# Patient Record
Sex: MALE | Race: BLACK OR AFRICAN AMERICAN | NOT HISPANIC OR LATINO
[De-identification: names, ages, dates, MRNs, and addresses within clinical notes are randomized per-mention and may not be internally consistent; named-entity substitution may affect disease eponyms.]

---
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
## 2021-07-09 ENCOUNTER — EMERGENCY (EMERGENCY)
Facility: HOSPITAL | Age: 21
LOS: 0 days | Discharge: HOME | End: 2021-07-10
Attending: STUDENT IN AN ORGANIZED HEALTH CARE EDUCATION/TRAINING PROGRAM
Payer: COMMERCIAL

## 2021-07-09 VITALS
TEMPERATURE: 98 F | OXYGEN SATURATION: 98 % | HEIGHT: 67 IN | DIASTOLIC BLOOD PRESSURE: 78 MMHG | RESPIRATION RATE: 18 BRPM | HEART RATE: 94 BPM | SYSTOLIC BLOOD PRESSURE: 142 MMHG | WEIGHT: 199.96 LBS

## 2021-07-09 DIAGNOSIS — S01.511A LACERATION WITHOUT FOREIGN BODY OF LIP, INITIAL ENCOUNTER: ICD-10-CM

## 2021-07-09 DIAGNOSIS — Z23 ENCOUNTER FOR IMMUNIZATION: ICD-10-CM
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
## 2021-07-09 DIAGNOSIS — Z98.890 OTHER SPECIFIED POSTPROCEDURAL STATES: Chronic | ICD-10-CM

## 2021-07-09 DIAGNOSIS — R53.1 WEAKNESS: ICD-10-CM
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
## 2021-07-09 DIAGNOSIS — R55 SYNCOPE AND COLLAPSE: ICD-10-CM

## 2021-07-09 PROCEDURE — 99284 EMERGENCY DEPT VISIT MOD MDM: CPT

## 2021-07-09 PROCEDURE — 93010 ELECTROCARDIOGRAM REPORT: CPT

## 2021-07-09 NOTE — ED ADULT NURSE NOTE - OBJECTIVE STATEMENT
patient came here for syncope and fall at a friend's house, witnessed by friend, complaining of mouth pain and bilateral hand pain, patient admits to smoking marijuana and felt numb all over prior to passing out

## 2021-07-09 NOTE — ED ADULT TRIAGE NOTE - CHIEF COMPLAINT QUOTE
was outside was walking and felt dizzy fall at the stairs 2 step smoke marihuana today.Fs 112, abrasion on chin and left hand

## 2021-07-10 VITALS
HEART RATE: 93 BPM | SYSTOLIC BLOOD PRESSURE: 130 MMHG | RESPIRATION RATE: 16 BRPM | OXYGEN SATURATION: 100 % | DIASTOLIC BLOOD PRESSURE: 74 MMHG

## 2021-07-10 PROCEDURE — 70100 X-RAY EXAM OF JAW <4VIEWS: CPT | Mod: 26

## 2021-07-10 PROCEDURE — 70450 CT HEAD/BRAIN W/O DYE: CPT | Mod: 26,MA

## 2021-07-10 RX ORDER — TETANUS TOXOID, REDUCED DIPHTHERIA TOXOID AND ACELLULAR PERTUSSIS VACCINE, ADSORBED 5; 2.5; 8; 8; 2.5 [IU]/.5ML; [IU]/.5ML; UG/.5ML; UG/.5ML; UG/.5ML
0.5 SUSPENSION INTRAMUSCULAR ONCE
Refills: 0 | Status: COMPLETED | OUTPATIENT
Start: 2021-07-10 | End: 2021-07-10

## 2021-07-10 RX ADMIN — TETANUS TOXOID, REDUCED DIPHTHERIA TOXOID AND ACELLULAR PERTUSSIS VACCINE, ADSORBED 0.5 MILLILITER(S): 5; 2.5; 8; 8; 2.5 SUSPENSION INTRAMUSCULAR at 02:15

## 2021-07-10 RX ADMIN — Medication 1 TABLET(S): at 02:15

## 2021-07-10 NOTE — ED PROVIDER NOTE - OBJECTIVE STATEMENT
21M no pmhx states that he was at a friend's house smoking marijuana earlier, realized he wasn't feeling shortly after using the cannabis, went to get up to so that he could go lie down when the next thing he remembers his friends were standing over him telling him that he passed out. Pt states that this has never happened to him before in his life. Reports no assoc cp, sob, palpitations. Reports no prior history of syncope. Notes that he noticed blood coming from his lip when he woke up.

## 2021-07-10 NOTE — ED PROVIDER NOTE - CARE PLAN
Principal Discharge DX:	Laceration of lower lip, initial encounter  Secondary Diagnosis:	Syncope and collapse

## 2021-07-10 NOTE — ED PROVIDER NOTE - CARE PROVIDER_API CALL
Piyush Bundy)  Cardiology; Cardiovascular Disease; Nuclear Cardiology  [address] [place] [address]  Phone: [phone]  Fax: [phone]  Follow Up Time:

## 2021-07-10 NOTE — ED PROVIDER NOTE - PATIENT PORTAL LINK FT
You can access the FollowMyHealth Patient Portal offered by [facility] by registering at the following website: [email or web address]. By joining [facility]’s FollowMyHealth portal, you will also be able to view your health information using other applications (apps) compatible with our system.

## 2021-07-10 NOTE — ED PROVIDER NOTE - CARDIAC, MLM
We see the persistent dry cough after Covid months afterwards. Some of it could be reactive airway disease. And try singular 10 mg nightly for 7 nights. She can do Coricidin HBP which will not interfere with blood pressure.   Just monitor for temperature Normal rate, regular rhythm.  Heart sounds S1, S2. No murmurs appreciated.

## 2021-07-10 NOTE — ED PROVIDER NOTE - CLINICAL SUMMARY MEDICAL DECISION MAKING FREE TEXT BOX
no murmurs or gallups. EKG with signs of LVH but without those of HOCM. Penetrating lip laceration repaired with 3 inner lip (vicryl) and 4 exterior (nylon). CTH without traumatic intracranial findings. Pt understands importance of cardiology followup for echo given finding of LVH on EKG tonight coupled with syncope (though suspect it was most likely marijuana related). Will DC pt to outpt cards.

## 2021-07-10 NOTE — ED PROVIDER NOTE - NSFOLLOWUPCLINICS_GEN_ALL_ED_FT
[facility]  Dental  [address] [place] [id number]  Phone: [phone]  Fax:

## 2021-07-10 NOTE — ED PROVIDER NOTE - NSFOLLOWUPINSTRUCTIONS_ED_ALL_ED_FT
1) Please follow-up with the enclosed cardiologist within the next week.  If you cannot follow-up with your doctor(s), please return to the ED for any urgent issues.  2) If you have any worsening of symptoms, including chest pain, difficulty breathing, sensation that you may pass out, or any other concerns please return to the ED immediately.  3) Please continue taking your home medications as directed.  4) You may have been given a copy of your labs and/or imaging.  Please go over these with your primary care doctor.  5) A prescription has been sent to your pharmacy. Please take it as directed.   6) We have also enclosed the contact information for the [facility] where you may followup for concerns related to your chipped tooth. 1) Please follow-up with the enclosed cardiologist within the next week. If you cannot follow-up with your doctor(s), please return to the ED for any urgent issues. Please also return to the ED in 7 days to have the (4) external lip sutures removed.   2) If you have any worsening of symptoms, including chest pain, difficulty breathing, sensation that you may pass out, or any other concerns please return to the ED immediately.  3) Please continue taking your home medications as directed.  4) You may have been given a copy of your labs and/or imaging.  Please go over these with your primary care doctor.  5) A prescription has been sent to your pharmacy. Please take it as directed.   6) We have also enclosed the contact information for the [facility] where you may followup for concerns related to your chipped tooth.

## 2021-07-10 NOTE — ED PROVIDER NOTE - CARE PROVIDERS DIRECT ADDRESSES
[email or web address]

## 2021-07-10 NOTE — ED PROVIDER NOTE - SKIN WOUND TYPE
penetrating lower lip laceration without foreign body/tooth. Pt with chipped front tooth./LACERATION(S)

## 2021-07-13 ENCOUNTER — APPOINTMENT (OUTPATIENT)
Dept: CARDIOLOGY | Facility: CLINIC | Age: 21
End: 2021-07-13
Payer: COMMERCIAL

## 2021-07-13 ENCOUNTER — NON-APPOINTMENT (OUTPATIENT)
Age: 21
End: 2021-07-13

## 2021-07-13 VITALS
DIASTOLIC BLOOD PRESSURE: 84 MMHG | OXYGEN SATURATION: 98 % | BODY MASS INDEX: 28.64 KG/M2 | WEIGHT: 189 LBS | HEART RATE: 68 BPM | HEIGHT: 68 IN | SYSTOLIC BLOOD PRESSURE: 128 MMHG

## 2021-07-13 DIAGNOSIS — R55 SYNCOPE AND COLLAPSE: ICD-10-CM

## 2021-07-13 PROCEDURE — 93000 ELECTROCARDIOGRAM COMPLETE: CPT

## 2021-07-13 PROCEDURE — 99203 OFFICE O/P NEW LOW 30 MIN: CPT

## 2021-07-13 PROCEDURE — 99072 ADDL SUPL MATRL&STAF TM PHE: CPT

## 2021-07-13 NOTE — PHYSICAL EXAM
[Well Developed] : well developed [Well Nourished] : well nourished [No Acute Distress] : no acute distress [Normal Conjunctiva] : normal conjunctiva [Normal Venous Pressure] : normal venous pressure [No Carotid Bruit] : no carotid bruit [Normal S1, S2] : normal S1, S2 [No Murmur] : no murmur [No Rub] : no rub [No Gallop] : no gallop [1+] : left 1+ [Clear Lung Fields] : clear lung fields [Good Air Entry] : good air entry [No Respiratory Distress] : no respiratory distress  [Soft] : abdomen soft [Non Tender] : non-tender [No Masses/organomegaly] : no masses/organomegaly [Normal Bowel Sounds] : normal bowel sounds [Normal Gait] : normal gait [No Edema] : no edema [No Cyanosis] : no cyanosis [No Clubbing] : no clubbing [No Varicosities] : no varicosities [No Rash] : no rash [No Skin Lesions] : no skin lesions [Moves all extremities] : moves all extremities [No Focal Deficits] : no focal deficits [Normal Speech] : normal speech [Alert and Oriented] : alert and oriented [Normal memory] : normal memory [Right Carotid Bruit] : no bruit heard over the right carotid [Left Carotid Bruit] : no bruit heard over the left carotid

## 2021-07-13 NOTE — HISTORY OF PRESENT ILLNESS
[FreeTextEntry1] : Carlos is a pleasant 21-year-old gentleman who is mildly overweight by BMI and comes to us for cardiac assessment after recent syncopal event.  He relates passing out with no warning and was taken to the hospital at [facility] where he had a work-up including head CT scan chest x-ray and these tests were reviewed with him.  ECG similar to today's revealing normal variant early repolarization.  On the day he passed out which is the first time this ever happened to him, he reports under hydrating and skipping meals along with having some marijuana.  He reports feeling much better lately without current chest symptoms.  No sudden cardiac death or early heart disease in an immediate family member reported.  No previous history of diabetes or tobacco smoking or hypertension.

## 2021-07-13 NOTE — CARDIOLOGY SUMMARY
[de-identified] : Sinus  Rhythm \par Voltage criteria for LVH  (R(aVF) exceeds 2.00 mV)  -Voltage criteria w/o ST/T abnormality may be normal. \par  -Inferolateral ST-elevation -repolarization variant. \par  -  Negative precordial T-waves. \par \par BORDERLINE\par

## 2021-07-13 NOTE — DISCUSSION/SUMMARY
[___ Week(s)] : in [unfilled] week(s) [FreeTextEntry3] : Echo and regular stress test [FreeTextEntry1] : I have ordered an echocardiogram to assess LV function and valvular status.  I will order a regular treadmill stress test to assess cardiac fitness and rule out any provocable ECG changes as well as check vital assessment to exercise.  I recommended a heart healthy lifestyle including a low-saturated fat, low cholesterol diet with improved aerobic physical fitness over time for cardiovascular benefits.  Carbohydrate and sodium restriction along with weight loss over time encouraged.  Alternative marijuana if necessary was suggested but no combustible type and avoidance of synthetic marijuana also stressed.  We will call the patient with test results and followup with you for general care.  I have also ordered routine lab work today which we will review with his tests.

## 2021-07-18 ENCOUNTER — EMERGENCY (EMERGENCY)
Facility: HOSPITAL | Age: 21
LOS: 0 days | Discharge: HOME | End: 2021-07-18
Payer: COMMERCIAL

## 2021-07-18 VITALS
HEIGHT: 67 IN | DIASTOLIC BLOOD PRESSURE: 81 MMHG | HEART RATE: 61 BPM | TEMPERATURE: 98 F | OXYGEN SATURATION: 100 % | SYSTOLIC BLOOD PRESSURE: 156 MMHG | WEIGHT: 188.94 LBS | RESPIRATION RATE: 18 BRPM

## 2021-07-18 DIAGNOSIS — S01.511D LACERATION WITHOUT FOREIGN BODY OF LIP, SUBSEQUENT ENCOUNTER: ICD-10-CM
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
## 2021-07-18 DIAGNOSIS — Z98.890 OTHER SPECIFIED POSTPROCEDURAL STATES: Chronic | ICD-10-CM
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
## 2021-07-18 PROCEDURE — L9995: CPT

## 2021-07-19 ENCOUNTER — APPOINTMENT (OUTPATIENT)
Dept: CARDIOLOGY | Facility: CLINIC | Age: 21
End: 2021-07-19
Payer: COMMERCIAL

## 2021-07-19 LAB
25(OH)D3 SERPL-MCNC: 37.8 NG/ML
ALBUMIN SERPL ELPH-MCNC: 5.1 G/DL
ALP BLD-CCNC: 86 U/L
ALT SERPL-CCNC: 28 U/L
ANION GAP SERPL CALC-SCNC: 12 MMOL/L
AST SERPL-CCNC: 42 U/L
BASOPHILS # BLD AUTO: 0.03 K/UL
BASOPHILS NFR BLD AUTO: 0.6 %
BILIRUB SERPL-MCNC: 0.2 MG/DL
BUN SERPL-MCNC: 9 MG/DL
CALCIUM SERPL-MCNC: 9.8 MG/DL
CHLORIDE SERPL-SCNC: 102 MMOL/L
CHOLEST SERPL-MCNC: 120 MG/DL
CO2 SERPL-SCNC: 26 MMOL/L
CREAT SERPL-MCNC: 1.13 MG/DL
EOSINOPHIL # BLD AUTO: 0.06 K/UL
EOSINOPHIL NFR BLD AUTO: 1.2 %
ESTIMATED AVERAGE GLUCOSE: 105 MG/DL
GLUCOSE SERPL-MCNC: 84 MG/DL
HBA1C MFR BLD HPLC: 5.3 %
HCT VFR BLD CALC: 48.4 %
HDLC SERPL-MCNC: 40 MG/DL
HGB BLD-MCNC: 15.1 G/DL
IMM GRANULOCYTES NFR BLD AUTO: 0.2 %
LDLC SERPL CALC-MCNC: 67 MG/DL
LYMPHOCYTES # BLD AUTO: 2 K/UL
LYMPHOCYTES NFR BLD AUTO: 39.6 %
MAN DIFF?: NORMAL
MCHC RBC-ENTMCNC: 26.9 PG
MCHC RBC-ENTMCNC: 31.2 GM/DL
MCV RBC AUTO: 86.1 FL
MONOCYTES # BLD AUTO: 0.43 K/UL
MONOCYTES NFR BLD AUTO: 8.5 %
NEUTROPHILS # BLD AUTO: 2.52 K/UL
NEUTROPHILS NFR BLD AUTO: 49.9 %
NONHDLC SERPL-MCNC: 80 MG/DL
PLATELET # BLD AUTO: 260 K/UL
POTASSIUM SERPL-SCNC: 4.8 MMOL/L
PROT SERPL-MCNC: 7.5 G/DL
RBC # BLD: 5.62 M/UL
RBC # FLD: 12.8 %
SODIUM SERPL-SCNC: 140 MMOL/L
TRIGL SERPL-MCNC: 68 MG/DL
TSH SERPL-ACNC: 2.25 UIU/ML
WBC # FLD AUTO: 5.05 K/UL

## 2021-07-19 PROCEDURE — 93306 TTE W/DOPPLER COMPLETE: CPT

## 2021-07-26 ENCOUNTER — EMERGENCY (EMERGENCY)
Facility: HOSPITAL | Age: 21
LOS: 0 days | Discharge: HOME | End: 2021-07-26
Payer: COMMERCIAL

## 2021-07-26 VITALS
WEIGHT: 188.94 LBS | HEIGHT: 67 IN | SYSTOLIC BLOOD PRESSURE: 158 MMHG | DIASTOLIC BLOOD PRESSURE: 101 MMHG | HEART RATE: 53 BPM | OXYGEN SATURATION: 99 % | RESPIRATION RATE: 18 BRPM | TEMPERATURE: 98 F

## 2021-07-26 DIAGNOSIS — Z98.890 OTHER SPECIFIED POSTPROCEDURAL STATES: Chronic | ICD-10-CM
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
## 2021-07-26 DIAGNOSIS — Z48.00 ENCOUNTER FOR CHANGE OR REMOVAL OF NONSURGICAL WOUND DRESSING: ICD-10-CM

## 2021-07-26 DIAGNOSIS — S01.511D LACERATION WITHOUT FOREIGN BODY OF LIP, SUBSEQUENT ENCOUNTER: ICD-10-CM

## 2021-07-26 PROCEDURE — 99282 EMERGENCY DEPT VISIT SF MDM: CPT

## 2021-07-26 NOTE — ED PROVIDER NOTE - PHYSICAL EXAMINATION
Physical Exam    Vital Signs: I have reviewed the initial vital signs.  Constitutional: well-nourished, appears stated age, no acute distress  Eyes: PERRLA, and symmetrical lids.  ENT: neck supple with no adenopathy, moist MM. Well healed L inner lip lac sutures in place  Cardiovascular: +S1/S2, no murmurs, regular rate, regular rhythm, well-perfused extremities  Respiratory: unlabored respiratory effort, clear to auscultation bilaterally, speaks in full sentences  Gastrointestinal: soft, non-tender abdomen, no pulsatile mass

## 2021-07-26 NOTE — ED PROVIDER NOTE - NS ED ROS FT
Review of Systems    Constitutional: (-) fever (-) weakness (-) diaphoresis   Eyes: (-) change in vision (-) phtophobia (-) eye pain  ENT: (-) sore throat (-) ear ache (-) nasal discharge  Cardiovascular: (-) chest pain  (-) palpitations  Respiratory: (-) SOB (-) cough   GI: (-) abdominal pain (-) N/V (-) diarrhea  Integumentary: (-) rash (-) redness   Neurological:  (-) focal deficit (-) altered mental status

## 2021-07-26 NOTE — ED ADULT TRIAGE NOTE - CHIEF COMPLAINT QUOTE
pt states "I accidentally bit into sutures on lower lip and I just want to make sure there's no swelling and everything is ok. I finished the entire course of antibiotic." no bleeding noted.

## 2021-07-26 NOTE — ED PROVIDER NOTE - OBJECTIVE STATEMENT
22 yo m presents for wound check of lower lip that was stured several days ago, pt thought he 'chewed through' sutures. no fevers, no bleeding.    I have reviewed available current nursing and previous documentation of past medical, surgical, family, and/or social history.

## 2021-07-26 NOTE — ED ADULT NURSE NOTE - OBJECTIVE STATEMENT
patient came in from home, AOX4 and ambulatory, with concerns about lip after suture removal. Patient has sutures removed two weeks ago and finish course of antibiotic but has concerns because he feels swelling on lower lip after he bit it while eating.

## 2021-07-26 NOTE — ED PROVIDER NOTE - PATIENT PORTAL LINK FT
You can access the FollowMyHealth Patient Portal offered by [facility] by registering at the following website: [email or web address]. By joining [facility]’s FollowMyHealth portal, you will also be able to view your health information using other applications (apps) compatible with our system.

## 2021-08-03 ENCOUNTER — APPOINTMENT (OUTPATIENT)
Dept: CARDIOLOGY | Facility: CLINIC | Age: 21
End: 2021-08-03
Payer: COMMERCIAL

## 2021-08-03 PROCEDURE — 93015 CV STRESS TEST SUPVJ I&R: CPT

## 2021-08-24 NOTE — ED PROVIDER NOTE - OBJECTIVE STATEMENT
22 y/o M presents to ED for suture removal of lower lip which was placed days ago no associated symptoms denies pain, fever, N/V and other concerns.

## 2021-08-24 NOTE — ED PROVIDER NOTE - SKIN, MLM
Skin normal color for race, warm, dry and intact. No evidence of rash, well healed lower lip laceration, non TTP, no swelling, erythema or discharge.

## 2021-08-24 NOTE — ED PROVIDER NOTE - PATIENT PORTAL LINK FT
You can access the FollowMyHealth Patient Portal offered by [facility] by registering at the following website: [email or web address]. By joining [facility]’s FollowMyHealth portal, you will also be able to view your health information using other applications (apps) compatible with our system.

## 2021-08-24 NOTE — ED PROVIDER NOTE - CLINICAL SUMMARY MEDICAL DECISION MAKING FREE TEXT BOX
22 y/o M with lower lip laceration presents to ED for suture removal, NAD, well healed laceration, no signs of infection, sutures removed with no cx dc home to f/u with PMD.

## 2022-10-25 NOTE — ED PROVIDER NOTE - GASTROINTESTINAL, MLM
No new care gaps identified.  [facility] Embedded Care Gaps. Reference number: [id number]. 10/25/2022   5:20:22 PM CDT   Abdomen soft, non-tender, no guarding.

## 2023-07-24 ENCOUNTER — OFFICE (OUTPATIENT)
Dept: URBAN - METROPOLITAN AREA CLINIC 34 | Facility: CLINIC | Age: 23
Setting detail: OPHTHALMOLOGY
End: 2023-07-24
Payer: COMMERCIAL

## 2023-07-24 DIAGNOSIS — H16.223: ICD-10-CM

## 2023-07-24 PROCEDURE — 92004 COMPRE OPH EXAM NEW PT 1/>: CPT | Performed by: OPHTHALMOLOGY

## 2023-07-24 ASSESSMENT — REFRACTION_AUTOREFRACTION
OD_AXIS: 011
OS_SPHERE: -0.25
OS_CYLINDER: +1.25
OD_CYLINDER: +1.00
OS_AXIS: 145
OD_SPHERE: PLANO

## 2023-07-24 ASSESSMENT — CONFRONTATIONAL VISUAL FIELD TEST (CVF)
OS_FINDINGS: FULL
OD_FINDINGS: FULL

## 2023-07-24 ASSESSMENT — TONOMETRY
OS_IOP_MMHG: 20
OD_IOP_MMHG: 20

## 2023-07-24 ASSESSMENT — AXIALLENGTH_DERIVED: OS_AL: 23.8621

## 2023-07-24 ASSESSMENT — KERATOMETRY
OS_K2POWER_DIOPTERS: 42.75
OD_AXISANGLE_DEGREES: 062
OS_K1POWER_DIOPTERS: 42.00
OD_K1POWER_DIOPTERS: 41.75
OS_AXISANGLE_DEGREES: 122
OD_K2POWER_DIOPTERS: 42.25

## 2023-07-24 ASSESSMENT — SPHEQUIV_DERIVED: OS_SPHEQUIV: 0.375

## 2023-07-24 ASSESSMENT — VISUAL ACUITY
OD_BCVA: 20/20
OS_BCVA: 20/20

## 2024-04-08 NOTE — ED PROVIDER NOTE - IV ALTEPLASE EXCL REL HIDDEN
Pt called to schedule appt with Emerald Mcpherson. LOV 6/2023. Pt requesting first avail.    Pt's# [phone]  
show